# Patient Record
Sex: MALE | Race: WHITE | NOT HISPANIC OR LATINO | URBAN - METROPOLITAN AREA
[De-identification: names, ages, dates, MRNs, and addresses within clinical notes are randomized per-mention and may not be internally consistent; named-entity substitution may affect disease eponyms.]

---

## 2018-01-29 ENCOUNTER — OFFICE VISIT CONVERTED (OUTPATIENT)
Dept: OTOLARYNGOLOGY | Facility: CLINIC | Age: 73
End: 2018-01-29
Attending: OTOLARYNGOLOGY

## 2018-01-29 ENCOUNTER — CONVERSION ENCOUNTER (OUTPATIENT)
Dept: OTOLARYNGOLOGY | Facility: CLINIC | Age: 73
End: 2018-01-29

## 2018-03-13 ENCOUNTER — PROCEDURE VISIT CONVERTED (OUTPATIENT)
Dept: PODIATRY | Facility: CLINIC | Age: 73
End: 2018-03-13
Attending: PODIATRIST

## 2018-06-08 ENCOUNTER — CONVERSION ENCOUNTER (OUTPATIENT)
Dept: OTHER | Facility: HOSPITAL | Age: 73
End: 2018-06-08

## 2018-06-08 ENCOUNTER — PROCEDURE VISIT CONVERTED (OUTPATIENT)
Dept: PODIATRY | Facility: CLINIC | Age: 73
End: 2018-06-08
Attending: PODIATRIST

## 2018-08-31 ENCOUNTER — PROCEDURE VISIT CONVERTED (OUTPATIENT)
Dept: PODIATRY | Facility: CLINIC | Age: 73
End: 2018-08-31
Attending: PODIATRIST

## 2018-11-01 ENCOUNTER — OFFICE VISIT CONVERTED (OUTPATIENT)
Dept: UROLOGY | Facility: CLINIC | Age: 73
End: 2018-11-01
Attending: UROLOGY

## 2018-11-26 ENCOUNTER — PROCEDURE VISIT CONVERTED (OUTPATIENT)
Dept: PODIATRY | Facility: CLINIC | Age: 73
End: 2018-11-26
Attending: PODIATRIST

## 2019-03-07 ENCOUNTER — PROCEDURE VISIT CONVERTED (OUTPATIENT)
Dept: PODIATRY | Facility: CLINIC | Age: 74
End: 2019-03-07
Attending: PODIATRIST

## 2019-03-19 ENCOUNTER — HOSPITAL ENCOUNTER (OUTPATIENT)
Dept: OTHER | Facility: HOSPITAL | Age: 74
Discharge: HOME | End: 2019-03-19
Attending: INTERNAL MEDICINE

## 2019-03-19 LAB
ALBUMIN SERPL-MCNC: 4.1 G/DL (ref 3.5–5)
ALBUMIN/GLOB SERPL: 1.7 {RATIO} (ref 1.4–2.6)
ALP SERPL-CCNC: 93 U/L (ref 56–155)
ALT SERPL-CCNC: 17 U/L (ref 10–40)
ANION GAP SERPL CALC-SCNC: 18 MMOL/L (ref 8–19)
AST SERPL-CCNC: 17 U/L (ref 15–50)
BILIRUB SERPL-MCNC: 0.25 MG/DL (ref 0.2–1.3)
BUN SERPL-MCNC: 8 MG/DL (ref 5–25)
BUN/CREAT SERPL: 11 {RATIO} (ref 6–20)
CALCIUM SERPL-MCNC: 9 MG/DL (ref 8.7–10.4)
CHLORIDE SERPL-SCNC: 103 MMOL/L (ref 99–111)
CHOLEST SERPL-MCNC: 128 MG/DL (ref 107–200)
CHOLEST/HDLC SERPL: 2.9 {RATIO} (ref 3–6)
CONV CO2: 28 MMOL/L (ref 22–32)
CONV TOTAL PROTEIN: 6.5 G/DL (ref 6.3–8.2)
CREAT UR-MCNC: 0.74 MG/DL (ref 0.7–1.2)
EST. AVERAGE GLUCOSE BLD GHB EST-MCNC: 154 MG/DL
GFR SERPLBLD BASED ON 1.73 SQ M-ARVRAT: >60 ML/MIN/{1.73_M2}
GLOBULIN UR ELPH-MCNC: 2.4 G/DL (ref 2–3.5)
GLUCOSE SERPL-MCNC: 110 MG/DL (ref 70–99)
HBA1C MFR BLD: 7 % (ref 3.5–5.7)
HDLC SERPL-MCNC: 44 MG/DL (ref 40–60)
LDLC SERPL CALC-MCNC: 51 MG/DL (ref 70–100)
OSMOLALITY SERPL CALC.SUM OF ELEC: 299 MOSM/KG (ref 273–304)
POTASSIUM SERPL-SCNC: 4.3 MMOL/L (ref 3.5–5.3)
SODIUM SERPL-SCNC: 145 MMOL/L (ref 135–147)
TRIGL SERPL-MCNC: 164 MG/DL (ref 40–150)
VLDLC SERPL-MCNC: 33 MG/DL (ref 5–37)

## 2019-05-30 ENCOUNTER — PROCEDURE VISIT CONVERTED (OUTPATIENT)
Dept: PODIATRY | Facility: CLINIC | Age: 74
End: 2019-05-30
Attending: PODIATRIST

## 2019-06-24 ENCOUNTER — HOSPITAL ENCOUNTER (OUTPATIENT)
Dept: CT IMAGING | Facility: HOSPITAL | Age: 74
Discharge: HOME | End: 2019-06-24
Attending: INTERNAL MEDICINE

## 2019-06-24 LAB
AMPHETAMINES UR QL SCN: NEGATIVE
ANION GAP SERPL CALC-SCNC: 17 MMOL/L (ref 8–19)
APPEARANCE UR: CLEAR
BARBITURATES UR QL SCN: NEGATIVE
BASOPHILS # BLD AUTO: 0.03 10*3/UL (ref 0–0.2)
BASOPHILS NFR BLD AUTO: 0.6 % (ref 0–3)
BENZODIAZ UR QL SCN: NEGATIVE
BILIRUB UR QL: NEGATIVE
BUN SERPL-MCNC: 9 MG/DL (ref 5–25)
BUN/CREAT SERPL: 11 {RATIO} (ref 6–20)
CALCIUM SERPL-MCNC: 9.1 MG/DL (ref 8.7–10.4)
CHLORIDE SERPL-SCNC: 102 MMOL/L (ref 99–111)
COLOR UR: YELLOW
CONV ABS IMM GRAN: 0.03 10*3/UL (ref 0–0.2)
CONV BACTERIA: NEGATIVE
CONV CO2: 27 MMOL/L (ref 22–32)
CONV COCAINE, UR: NEGATIVE
CONV COLLECTION SOURCE (UA): ABNORMAL
CONV IMMATURE GRAN: 0.6 % (ref 0–1.8)
CONV UROBILINOGEN IN URINE BY AUTOMATED TEST STRIP: 1 {EHRLICHU}/DL (ref 0.1–1)
CREAT UR-MCNC: 0.84 MG/DL (ref 0.7–1.2)
DEPRECATED RDW RBC AUTO: 43.2 FL (ref 35.1–43.9)
EOSINOPHIL # BLD AUTO: 0.14 10*3/UL (ref 0–0.7)
EOSINOPHIL # BLD AUTO: 2.6 % (ref 0–7)
ERYTHROCYTE [DISTWIDTH] IN BLOOD BY AUTOMATED COUNT: 12.7 % (ref 11.6–14.4)
EST. AVERAGE GLUCOSE BLD GHB EST-MCNC: 160 MG/DL
GFR SERPLBLD BASED ON 1.73 SQ M-ARVRAT: >60 ML/MIN/{1.73_M2}
GLUCOSE SERPL-MCNC: 287 MG/DL (ref 70–99)
GLUCOSE UR QL: 500 MG/DL
HBA1C MFR BLD: 12.1 G/DL (ref 14–18)
HBA1C MFR BLD: 7.2 % (ref 3.5–5.7)
HCT VFR BLD AUTO: 38 % (ref 42–52)
HGB UR QL STRIP: ABNORMAL
KETONES UR QL STRIP: NEGATIVE MG/DL
LEUKOCYTE ESTERASE UR QL STRIP: NEGATIVE
LYMPHOCYTES # BLD AUTO: 1.3 10*3/UL (ref 1–5)
MCH RBC QN AUTO: 29.6 PG (ref 27–31)
MCHC RBC AUTO-ENTMCNC: 31.8 G/DL (ref 33–37)
MCV RBC AUTO: 92.9 FL (ref 80–96)
METHADONE UR QL SCN: NEGATIVE
MONOCYTES # BLD AUTO: 0.48 10*3/UL (ref 0.2–1.2)
MONOCYTES NFR BLD AUTO: 8.8 % (ref 3–10)
NEUTROPHILS # BLD AUTO: 3.46 10*3/UL (ref 2–8)
NEUTROPHILS NFR BLD AUTO: 63.5 % (ref 30–85)
NITRITE UR QL STRIP: NEGATIVE
NRBC CBCN: 0 % (ref 0–0.7)
OPIATES TESTED UR SCN: NEGATIVE
OSMOLALITY SERPL CALC.SUM OF ELEC: 303 MOSM/KG (ref 273–304)
OXYCODONE UR QL SCN: NEGATIVE
PCP UR QL: NEGATIVE
PH UR STRIP.AUTO: 6 [PH] (ref 5–8)
PLATELET # BLD AUTO: 195 10*3/UL (ref 130–400)
PMV BLD AUTO: 9.9 FL (ref 9.4–12.4)
POTASSIUM SERPL-SCNC: 4.4 MMOL/L (ref 3.5–5.3)
PROT UR QL: NEGATIVE MG/DL
RBC # BLD AUTO: 4.09 10*6/UL (ref 4.7–6.1)
RBC #/AREA URNS HPF: ABNORMAL /[HPF]
SODIUM SERPL-SCNC: 142 MMOL/L (ref 135–147)
SP GR UR: 1.01 (ref 1–1.03)
THC SERPLBLD CFM-MCNC: NEGATIVE NG/ML
VARIANT LYMPHS NFR BLD MANUAL: 23.9 % (ref 20–45)
WBC # BLD AUTO: 5.44 10*3/UL (ref 4.8–10.8)
WBC #/AREA URNS HPF: ABNORMAL /[HPF]

## 2019-06-26 LAB — BACTERIA UR CULT: NORMAL

## 2019-08-20 ENCOUNTER — HOSPITAL ENCOUNTER (OUTPATIENT)
Dept: OTHER | Facility: HOSPITAL | Age: 74
Discharge: HOME | End: 2019-08-20
Attending: INTERNAL MEDICINE

## 2019-08-20 LAB
ANION GAP SERPL CALC-SCNC: 21 MMOL/L (ref 8–19)
BUN SERPL-MCNC: 10 MG/DL (ref 5–25)
BUN/CREAT SERPL: 12 {RATIO} (ref 6–20)
CALCIUM SERPL-MCNC: 9.2 MG/DL (ref 8.7–10.4)
CHLORIDE SERPL-SCNC: 100 MMOL/L (ref 99–111)
CONV CO2: 25 MMOL/L (ref 22–32)
CREAT UR-MCNC: 0.82 MG/DL (ref 0.7–1.2)
EST. AVERAGE GLUCOSE BLD GHB EST-MCNC: 157 MG/DL
GFR SERPLBLD BASED ON 1.73 SQ M-ARVRAT: >60 ML/MIN/{1.73_M2}
GLUCOSE SERPL-MCNC: 192 MG/DL (ref 70–99)
HBA1C MFR BLD: 7.1 % (ref 3.5–5.7)
OSMOLALITY SERPL CALC.SUM OF ELEC: 296 MOSM/KG (ref 273–304)
POTASSIUM SERPL-SCNC: 5.1 MMOL/L (ref 3.5–5.3)
PSA SERPL-MCNC: <0.01 NG/ML (ref 0–4)
SODIUM SERPL-SCNC: 141 MMOL/L (ref 135–147)

## 2019-09-25 ENCOUNTER — PROCEDURE VISIT CONVERTED (OUTPATIENT)
Dept: PODIATRY | Facility: CLINIC | Age: 74
End: 2019-09-25
Attending: PODIATRIST

## 2019-11-11 ENCOUNTER — HOSPITAL ENCOUNTER (OUTPATIENT)
Dept: NUCLEAR MEDICINE | Facility: HOSPITAL | Age: 74
Discharge: HOME | End: 2019-11-11
Attending: INTERNAL MEDICINE

## 2019-11-25 ENCOUNTER — CONVERSION ENCOUNTER (OUTPATIENT)
Dept: NEUROLOGY | Facility: CLINIC | Age: 74
End: 2019-11-25

## 2019-11-25 ENCOUNTER — OFFICE VISIT CONVERTED (OUTPATIENT)
Dept: NEUROLOGY | Facility: CLINIC | Age: 74
End: 2019-11-25
Attending: PSYCHIATRY & NEUROLOGY

## 2019-12-03 ENCOUNTER — HOSPITAL ENCOUNTER (OUTPATIENT)
Dept: OTHER | Facility: HOSPITAL | Age: 74
Discharge: HOME | End: 2019-12-03
Attending: INTERNAL MEDICINE

## 2019-12-03 LAB
ALBUMIN SERPL-MCNC: 4.4 G/DL (ref 3.5–5)
ALBUMIN/GLOB SERPL: 1.7 {RATIO} (ref 1.4–2.6)
ALP SERPL-CCNC: 79 U/L (ref 56–155)
ALT SERPL-CCNC: 22 U/L (ref 10–40)
ANION GAP SERPL CALC-SCNC: 18 MMOL/L (ref 8–19)
AST SERPL-CCNC: 26 U/L (ref 15–50)
BASOPHILS # BLD AUTO: 0.06 10*3/UL (ref 0–0.2)
BASOPHILS NFR BLD AUTO: 0.9 % (ref 0–3)
BILIRUB SERPL-MCNC: 0.31 MG/DL (ref 0.2–1.3)
BUN SERPL-MCNC: 11 MG/DL (ref 5–25)
BUN/CREAT SERPL: 14 {RATIO} (ref 6–20)
CALCIUM SERPL-MCNC: 9.9 MG/DL (ref 8.7–10.4)
CHLORIDE SERPL-SCNC: 104 MMOL/L (ref 99–111)
CHOLEST SERPL-MCNC: 144 MG/DL (ref 107–200)
CHOLEST/HDLC SERPL: 3 {RATIO} (ref 3–6)
CONV ABS IMM GRAN: 0.06 10*3/UL (ref 0–0.2)
CONV CO2: 28 MMOL/L (ref 22–32)
CONV CREATININE URINE, RANDOM: 66.2 MG/DL (ref 10–300)
CONV IMMATURE GRAN: 0.9 % (ref 0–1.8)
CONV MICROALBUM.,U,RANDOM: <12 MG/L (ref 0–20)
CONV TOTAL PROTEIN: 7 G/DL (ref 6.3–8.2)
CREAT UR-MCNC: 0.8 MG/DL (ref 0.7–1.2)
DEPRECATED RDW RBC AUTO: 44.3 FL (ref 35.1–43.9)
EOSINOPHIL # BLD AUTO: 0.14 10*3/UL (ref 0–0.7)
EOSINOPHIL # BLD AUTO: 2.1 % (ref 0–7)
ERYTHROCYTE [DISTWIDTH] IN BLOOD BY AUTOMATED COUNT: 12.9 % (ref 11.6–14.4)
EST. AVERAGE GLUCOSE BLD GHB EST-MCNC: 151 MG/DL
FOLATE SERPL-MCNC: 10.9 NG/ML (ref 4.8–20)
GFR SERPLBLD BASED ON 1.73 SQ M-ARVRAT: >60 ML/MIN/{1.73_M2}
GLOBULIN UR ELPH-MCNC: 2.6 G/DL (ref 2–3.5)
GLUCOSE SERPL-MCNC: 110 MG/DL (ref 70–99)
HBA1C MFR BLD: 6.9 % (ref 3.5–5.7)
HCT VFR BLD AUTO: 42.8 % (ref 42–52)
HDLC SERPL-MCNC: 48 MG/DL (ref 40–60)
HGB BLD-MCNC: 13.6 G/DL (ref 14–18)
LDLC SERPL CALC-MCNC: 62 MG/DL (ref 70–100)
LYMPHOCYTES # BLD AUTO: 1.72 10*3/UL (ref 1–5)
LYMPHOCYTES NFR BLD AUTO: 25.3 % (ref 20–45)
MCH RBC QN AUTO: 29.9 PG (ref 27–31)
MCHC RBC AUTO-ENTMCNC: 31.8 G/DL (ref 33–37)
MCV RBC AUTO: 94.1 FL (ref 80–96)
MICROALBUMIN/CREAT UR: 18.1 MG/G{CRE} (ref 0–25)
MONOCYTES # BLD AUTO: 0.56 10*3/UL (ref 0.2–1.2)
MONOCYTES NFR BLD AUTO: 8.2 % (ref 3–10)
NEUTROPHILS # BLD AUTO: 4.25 10*3/UL (ref 2–8)
NEUTROPHILS NFR BLD AUTO: 62.6 % (ref 30–85)
NRBC CBCN: 0 % (ref 0–0.7)
OSMOLALITY SERPL CALC.SUM OF ELEC: 300 MOSM/KG (ref 273–304)
PLATELET # BLD AUTO: 235 10*3/UL (ref 130–400)
PMV BLD AUTO: 11 FL (ref 9.4–12.4)
POTASSIUM SERPL-SCNC: 4.9 MMOL/L (ref 3.5–5.3)
RBC # BLD AUTO: 4.55 10*6/UL (ref 4.7–6.1)
SODIUM SERPL-SCNC: 145 MMOL/L (ref 135–147)
TRIGL SERPL-MCNC: 172 MG/DL (ref 40–150)
VIT B12 SERPL-MCNC: 278 PG/ML (ref 211–911)
VLDLC SERPL-MCNC: 34 MG/DL (ref 5–37)
WBC # BLD AUTO: 6.79 10*3/UL (ref 4.8–10.8)

## 2019-12-06 ENCOUNTER — HOSPITAL ENCOUNTER (OUTPATIENT)
Dept: MRI IMAGING | Facility: HOSPITAL | Age: 74
Discharge: HOME | End: 2019-12-06

## 2019-12-11 ENCOUNTER — PROCEDURE VISIT CONVERTED (OUTPATIENT)
Dept: PODIATRY | Facility: CLINIC | Age: 74
End: 2019-12-11
Attending: PODIATRIST

## 2020-03-04 ENCOUNTER — PROCEDURE VISIT CONVERTED (OUTPATIENT)
Dept: PODIATRY | Facility: CLINIC | Age: 75
End: 2020-03-04
Attending: PODIATRIST

## 2020-04-29 ENCOUNTER — HOSPITAL ENCOUNTER (OUTPATIENT)
Dept: OTHER | Facility: HOSPITAL | Age: 75
Discharge: HOME | End: 2020-04-29
Attending: INTERNAL MEDICINE

## 2020-04-29 LAB
ANION GAP SERPL CALC-SCNC: 28 MMOL/L (ref 8–19)
BUN SERPL-MCNC: 12 MG/DL (ref 5–25)
BUN/CREAT SERPL: 13 {RATIO} (ref 6–20)
CALCIUM SERPL-MCNC: 9.8 MG/DL (ref 8.7–10.4)
CHLORIDE SERPL-SCNC: 102 MMOL/L (ref 99–111)
CONV CO2: 20 MMOL/L (ref 22–32)
CREAT UR-MCNC: 0.96 MG/DL (ref 0.7–1.2)
EST. AVERAGE GLUCOSE BLD GHB EST-MCNC: 209 MG/DL
FOLATE SERPL-MCNC: >20 NG/ML (ref 4.8–20)
GFR SERPLBLD BASED ON 1.73 SQ M-ARVRAT: >60 ML/MIN/{1.73_M2}
GLUCOSE SERPL-MCNC: 209 MG/DL (ref 70–99)
HBA1C MFR BLD: 8.9 % (ref 3.5–5.7)
OSMOLALITY SERPL CALC.SUM OF ELEC: 306 MOSM/KG (ref 273–304)
POTASSIUM SERPL-SCNC: 4.7 MMOL/L (ref 3.5–5.3)
SODIUM SERPL-SCNC: 145 MMOL/L (ref 135–147)
VIT B12 SERPL-MCNC: 444 PG/ML (ref 211–911)

## 2020-06-24 ENCOUNTER — PROCEDURE VISIT CONVERTED (OUTPATIENT)
Dept: PODIATRY | Facility: CLINIC | Age: 75
End: 2020-06-24
Attending: PODIATRIST

## 2020-07-06 ENCOUNTER — HOSPITAL ENCOUNTER (OUTPATIENT)
Dept: LAB | Facility: HOSPITAL | Age: 75
Discharge: HOME | End: 2020-07-06
Attending: INTERNAL MEDICINE

## 2020-07-06 LAB
ANION GAP SERPL CALC-SCNC: 16 MMOL/L (ref 8–19)
BUN SERPL-MCNC: 15 MG/DL (ref 5–25)
BUN/CREAT SERPL: 15 {RATIO} (ref 6–20)
CALCIUM SERPL-MCNC: 9.3 MG/DL (ref 8.7–10.4)
CHLORIDE SERPL-SCNC: 101 MMOL/L (ref 99–111)
CONV CO2: 24 MMOL/L (ref 22–32)
CREAT UR-MCNC: 0.97 MG/DL (ref 0.7–1.2)
EST. AVERAGE GLUCOSE BLD GHB EST-MCNC: 206 MG/DL
GFR SERPLBLD BASED ON 1.73 SQ M-ARVRAT: >60 ML/MIN/{1.73_M2}
GLUCOSE SERPL-MCNC: 177 MG/DL (ref 70–99)
HBA1C MFR BLD: 8.8 % (ref 3.5–5.7)
OSMOLALITY SERPL CALC.SUM OF ELEC: 289 MOSM/KG (ref 273–304)
POTASSIUM SERPL-SCNC: 4.4 MMOL/L (ref 3.5–5.3)
SODIUM SERPL-SCNC: 137 MMOL/L (ref 135–147)

## 2020-07-27 ENCOUNTER — CONVERSION ENCOUNTER (OUTPATIENT)
Dept: SURGERY | Facility: CLINIC | Age: 75
End: 2020-07-27

## 2020-07-27 ENCOUNTER — OFFICE VISIT CONVERTED (OUTPATIENT)
Dept: UROLOGY | Facility: CLINIC | Age: 75
End: 2020-07-27
Attending: UROLOGY

## 2020-09-22 ENCOUNTER — HOSPITAL ENCOUNTER (OUTPATIENT)
Dept: GENERAL RADIOLOGY | Facility: HOSPITAL | Age: 75
Discharge: HOME | End: 2020-09-22
Attending: INTERNAL MEDICINE

## 2020-09-22 LAB
ALBUMIN SERPL-MCNC: 4.1 G/DL (ref 3.5–5)
ALBUMIN/GLOB SERPL: 1.8 {RATIO} (ref 1.4–2.6)
ALP SERPL-CCNC: 95 U/L (ref 56–155)
ALT SERPL-CCNC: 27 U/L (ref 10–40)
ANION GAP SERPL CALC-SCNC: 13 MMOL/L (ref 8–19)
AST SERPL-CCNC: 27 U/L (ref 15–50)
BASOPHILS # BLD AUTO: 0.04 10*3/UL (ref 0–0.2)
BASOPHILS NFR BLD AUTO: 0.6 % (ref 0–3)
BILIRUB SERPL-MCNC: 0.23 MG/DL (ref 0.2–1.3)
BUN SERPL-MCNC: 14 MG/DL (ref 5–25)
BUN/CREAT SERPL: 16 {RATIO} (ref 6–20)
CALCIUM SERPL-MCNC: 9.1 MG/DL (ref 8.7–10.4)
CHLORIDE SERPL-SCNC: 106 MMOL/L (ref 99–111)
CHOLEST SERPL-MCNC: 126 MG/DL (ref 107–200)
CHOLEST/HDLC SERPL: 2.8 {RATIO} (ref 3–6)
CONV ABS IMM GRAN: 0.02 10*3/UL (ref 0–0.2)
CONV CO2: 27 MMOL/L (ref 22–32)
CONV IMMATURE GRAN: 0.3 % (ref 0–1.8)
CONV TOTAL PROTEIN: 6.4 G/DL (ref 6.3–8.2)
CREAT UR-MCNC: 0.89 MG/DL (ref 0.7–1.2)
DEPRECATED RDW RBC AUTO: 46.7 FL (ref 35.1–43.9)
EOSINOPHIL # BLD AUTO: 0.26 10*3/UL (ref 0–0.7)
EOSINOPHIL # BLD AUTO: 3.6 % (ref 0–7)
ERYTHROCYTE [DISTWIDTH] IN BLOOD BY AUTOMATED COUNT: 13.4 % (ref 11.6–14.4)
EST. AVERAGE GLUCOSE BLD GHB EST-MCNC: 143 MG/DL
FOLATE SERPL-MCNC: 13.8 NG/ML (ref 4.8–20)
GFR SERPLBLD BASED ON 1.73 SQ M-ARVRAT: >60 ML/MIN/{1.73_M2}
GLOBULIN UR ELPH-MCNC: 2.3 G/DL (ref 2–3.5)
GLUCOSE SERPL-MCNC: 154 MG/DL (ref 70–99)
HBA1C MFR BLD: 6.6 % (ref 3.5–5.7)
HCT VFR BLD AUTO: 40.8 % (ref 42–52)
HDLC SERPL-MCNC: 45 MG/DL (ref 40–60)
HGB BLD-MCNC: 12.8 G/DL (ref 14–18)
LDLC SERPL CALC-MCNC: 44 MG/DL (ref 70–100)
LYMPHOCYTES # BLD AUTO: 1.08 10*3/UL (ref 1–5)
LYMPHOCYTES NFR BLD AUTO: 15 % (ref 20–45)
MCH RBC QN AUTO: 30 PG (ref 27–31)
MCHC RBC AUTO-ENTMCNC: 31.4 G/DL (ref 33–37)
MCV RBC AUTO: 95.8 FL (ref 80–96)
MONOCYTES # BLD AUTO: 0.49 10*3/UL (ref 0.2–1.2)
MONOCYTES NFR BLD AUTO: 6.8 % (ref 3–10)
NEUTROPHILS # BLD AUTO: 5.31 10*3/UL (ref 2–8)
NEUTROPHILS NFR BLD AUTO: 73.7 % (ref 30–85)
NRBC CBCN: 0 % (ref 0–0.7)
OSMOLALITY SERPL CALC.SUM OF ELEC: 298 MOSM/KG (ref 273–304)
PLATELET # BLD AUTO: 187 10*3/UL (ref 130–400)
PMV BLD AUTO: 10.4 FL (ref 9.4–12.4)
POTASSIUM SERPL-SCNC: 4.4 MMOL/L (ref 3.5–5.3)
RBC # BLD AUTO: 4.26 10*6/UL (ref 4.7–6.1)
SODIUM SERPL-SCNC: 142 MMOL/L (ref 135–147)
TRIGL SERPL-MCNC: 185 MG/DL (ref 40–150)
VIT B12 SERPL-MCNC: 433 PG/ML (ref 211–911)
VLDLC SERPL-MCNC: 37 MG/DL (ref 5–37)
WBC # BLD AUTO: 7.2 10*3/UL (ref 4.8–10.8)

## 2020-09-23 ENCOUNTER — PROCEDURE VISIT CONVERTED (OUTPATIENT)
Dept: PODIATRY | Facility: CLINIC | Age: 75
End: 2020-09-23
Attending: PODIATRIST

## 2020-10-16 ENCOUNTER — OFFICE VISIT CONVERTED (OUTPATIENT)
Dept: ORTHOPEDIC SURGERY | Facility: CLINIC | Age: 75
End: 2020-10-16
Attending: ORTHOPAEDIC SURGERY

## 2020-11-11 ENCOUNTER — HOSPITAL ENCOUNTER (OUTPATIENT)
Dept: OTHER | Facility: HOSPITAL | Age: 75
Discharge: HOME | End: 2020-11-11
Attending: INTERNAL MEDICINE

## 2021-05-13 NOTE — PROGRESS NOTES
Progress Note      Patient Name: Ami Ferrer   Patient ID: [id number]   Sex: Male   YOB: 1945    Primary Care Provider: Mundo Basilio MD   Referring Provider: Mundo Basilio MD    Visit Date: September 23, 2020    Provider: Hardik Pitts DPM   Location: [facility]   Location Address: [address] [place] [address]  [id number]   Location Phone: [phone]          Chief Complaint  · Routine Foot Care Visit  · Diabetic Foot Examination       History Of Present Illness  Ami Ferrer complains of painful, elongated toenails which are thickened, yellowed, chalky, and cause pain with shoe gear and ambulation.      New, Established, New Problem:  est  Location:  Toenails  Duration:  Greater than five years  Onset:  Gradual  Nature:  sore with palpation.  Stable, worsening, improving:  stable  Aggravating factors:  Pain with shoe gear and ambulation.  Previous Treatment:  debridement    Patient denies any fevers, chills, nausea, vomiting, shortness of breathe, nor any other constitutional signs nor symptoms.    Patient reports the following medical changes since their last visit:    - CT of head for headaches.    Pt states their most recent blood glucose reading was 115.       Past Medical History  Allergic rhinitis, chronic; Arthritis; Asthma; Chronic Obstructive Pulmonary Disease; COPD; Depression; Diabetes; Diabetes Mellitus, Type II; Difficulty in walking; Fatigue; Foot cramps; Foot pain, left; Foot pain, right; Fracture; GERD; Gout; Heart disease; Herniated Disc; High blood pressure; High cholesterol; Hypogonadism (Testicular Failure); Ingrowing nail; Ingrowing toenail; Leg pain; Limb Pain; Limb Swelling; Muscle cramps; Neuropathy; Numbness in feet; Polyneuropathy; Prostate Disorder; Shortness Of Air; Tinea unguium; Type 2 diabetes mellitus with polyneuropathy         Past Surgical History  *Other; Arm Surgery; Finger Surgery; Hand surgery; Penile implant; Polypectomy; Sinus  Surgery         Medication List  albuterol sulfate 90 mcg/actuation inhalation HFA aerosol inhaler; allopurinol 100 mg oral tablet; Amitiza 24 mcg oral capsule; aspirin 81 mg oral tablet,delayed release (DR/EC); Ativan 1 mg oral tablet; atorvastatin 10 mg oral tablet; celecoxib 100 mg oral capsule; cetirizine 10 mg oral tablet; cyanocobalamin (vitamin B-12) 1,000 mcg/mL injection solution; Daily Multiple oral tablet; Dulera 200-5 mcg/actuation inhalation HFA aerosol inhaler; finasteride 5 mg oral tablet; fluticasone propionate 50 mcg/actuation nasal spray,suspension; glyburide 2.5 mg oral tablet; metformin 500 mg oral tablet; metoprolol succinate 25 mg oral tablet extended release 24 hr; omeprazole 20 mg oral capsule,delayed release(DR/EC); sertraline 100 mg oral tablet; Spiriva with HandiHaler 18 mcg inhalation capsule, w/inhalation device; tamsulosin 0.4 mg oral capsule; tramadol 50 mg oral tablet; trazodone 50 mg oral tablet; Tylenol Arthritis Pain 650 mg oral tablet extended release; vitamin B complex oral tablet; Vitamin B-12 500 mcg oral tablet; Vitamin D3 2,000 unit oral capsule         Allergy List  I.V. Dye       Allergies Reconciled  Family Medical History  Pancreatic Neoplasm, Malignant; Diabetes, unspecified type; Throat Cancer; Brain tumor; Family history of colon cancer; Bone Neoplasm, Malignant; Family history of Arthritis; Family history of cancer         Social History  Alcohol (Never); lives alone; Retired; Tobacco (Former);          Review of Systems  · Constitutional  o Denies  o : fever, chills  · Eyes  o Denies  o : double vision  · HENT  o Denies  o : vertigo, recent head injury, hearing loss or ringing  · Cardiovascular  o Denies  o : chest pain, irregular heart beats  · Respiratory  o Denies  o : shortness of breath, productive cough  · Gastrointestinal  o Denies  o : nausea, vomiting  · Integument  o * See HPI  · Neurologic  o Admits  o : tingling or numbness  o Denies  o : altered  "mental status, seizures  · Musculoskeletal  o Denies  o : joint pain, joint swelling, limitation of motion      Vitals  Date Time BP Position Site L\R Cuff Size HR RR TEMP (F) WT  HT  BMI kg/m2 BSA m2 O2 Sat HC       09/23/2020 12:53 /67 Sitting    66 - R  97.3 174lbs 16oz 5'  7.5\" 27 1.94 98 %          Physical Examination  · Constitutional  o Appearance  o : well-nourished, well developed, no obvious deformities present, appears to be chronically ill   · Eyes  o Vision  o : Patient wearing glasses.   · Respiratory  o Respiratory Effort  o : No labored breathing. Good respiratory effort.   · Cardiovascular  o Peripheral Vascular System  o :   § Pedal Pulses  § : Pedal Pulses are 2+ and symmetrical  § Extremities  § : There is no edema of the lower extremities  · Musculoskeletal  o Extremeties/Joint  o : Lower extremity muscle strength and range of motion is equal and symmetrical bilaterally. The knees are noted to be in normal alignment. Ankle alignment and range of motion is normal and foot structure is normal.  · Skin and Subcutaneous Tissue  o General Inspection  o : no lesions present, no areas of discoloration, skin turgor normal, texture normal  · Neurologic  o Sensation  o : Lagrange-Melo 5.07 monofilament absent to all assessed areas. Sharp/dull sensation is absent.  · Toes  o Toes: Right Foot  o :   § Inspection  § : semi-rigid flexion deformity-1st toe 2nd toe 3rd toe 4th toe 5th toe PIPJ   § Toenails  § : Toenails are hypertrophic, mycotc, dystrophic, thickened, with sublingual debris, incurvated, brittle toenail(s) at nail, Oncholysis of the foot   o Toes: Left Foot  o :   § Inspection  § : semi-rigid flexion deformity-2nd toe 3rd toe 4th toe 5th toe PIPJ   § Toenails  § : Toenails are hypertrophic, mycotc, dystrophic, thickened, with sublingual debris, incurvated, brittle toenail(s) at nail-2, 3, 4,, Onycholysis of the foot   · Procedures  o Nail Debridement  o : Nail debridement is indicated " for the following toenails:, left hallux, left 2nd toe, left 3rd toe, left 4th toe, left 5th toe, right hallux, right 2nd toe, right 3rd toe, right 4th toe, right 5th toe. The nail was debrided of excessive thickness to appropriate levels of comfort and contour using, nail nippers. The procedure was without complications          Assessment  · Foot pain, left     729.5/M79.672  · Foot pain, right     729.5/M79.671  · Ingrowing nail     703.0/L60.0  · Polyneuropathy     356.9/G62.9  · Tinea unguium     110.1/B35.1  · Type 2 diabetes mellitus with polyneuropathy       Type 2 diabetes mellitus with diabetic polyneuropathy     250.60/E11.42  · Difficulty in walking     719.7/R26.2      Plan  · Orders  o Debridement of six or more nails (98294, 96277, 18005, 62862, 43907, 53839, 32679) - 250.60/E11.42, 729.5/M79.672, 729.5/M79.671, 110.1/B35.1 - 09/23/2020  o Diabetic Foot (Motor and Sensory) Exam Completed [facility] (, , 2028F) - 250.60/E11.42 - 09/23/2020  · Medications  o Medications have been Reconciled  o Transition of Care or Provider Policy  · Instructions  o I have discussed the findings of this evaluation with the patient. The discussion included a complete verbal explanation of any changes in the examination results, diagnosis, and the current treatment plan. A schedule for future care needs was explained. If any questions should arise after returning home, I have encouraged the patient to feel free to contact Dr. Pitts. The patient states understanding and agreement with this plan.   o Patient is to monitor for problems and to contact Dr. Pitts for follow-up should such signs occur. Patient states understanding and agreement with this plan.   o Encouraged to follow-up with Primary Care Provider for preventative care.   o Diabetic foot exam performed and documented this date, compliant with [facility] required standards. Detail of findings as noted in physical exam.Lower extremity Neuro exam for diabetic  patient performed and documented this date, compliant with PQRS required standards. Detail of findings as noted in physical exam.Advised patient importance of good routine lower extremity hygiene. Advised patient importance of evaluating for intact skin and pain free nail borders. Advised patient to use mirror to evaluate plantar/ soles of feet for better visualization. Advised patient monitor and phone office to be seen if any cracking to skin, open lesions, painful nail borders or if nails become elongated prior to next visit. Advised patient importance of daily cleansing of lower extremities, followed by good skin cream to maintain normal hydration of skin. Also advised patient importance of close daily monitoring of blood sugar. Advised to regulate diet and medications to maintain control of blood sugar in optimal range. Contact primary care provider if difficulties maintaining blood sugar levels.Advised Patient of presence of Diabetes Mellitus condition. Advised Patient risk of progression and worsening or improvement, then return of condition. Will monitor condition for any change in future. Treat with most appropriate treatment pending status of condition. Counseled and advised patient extensively on nature and ramifications of diabetes. Standard instructions given to patient for good diabetic foot care and maintenance. Advised importance of careful monitoring to avoid break down and complications secondary to diabetes. Advised patient importance of strict maintenance of blood sugar control. Advised patient of possible ominous results from neglect of condition, i.e.: amputation/ loss of digits, feet and legs, or even death.Patient states understands counseling, will monitor closely, continue good hygiene and routine diabetic foot care. Patient will contact office is questions or problems.   o Follow up in 9 weeks for Routine Foot Care.   o Electronically Identified Patient Education Materials Provided  Electronically  · Disposition  o Call or Return if symptoms worsen or persist.            Electronically Signed by: Hardik Pitts DPM -Author on September 23, 2020 01:06:31 PM

## 2021-05-13 NOTE — PROGRESS NOTES
Progress Note      Patient Name: Ami Ferrer   Patient ID: [id number]   Sex: Male   YOB: 1945    Primary Care Provider: Mundo Basilio MD   Referring Provider: Mundo Basilio MD    Visit Date: July 27, 2020    Provider: Karis Sousa MD   Location: [facility]   Location Address: [address] [place] [address]  [id number]   Location Phone: [phone]          Chief Complaint  · urinary incontinence      History Of Present Illness     The patient is a very pleasant 75-year-old male is seen in follow-up for problems with BPH and bladder outlet obstruction and hypogonadism.      He is on finasteride and Flomax and is doing well on those.  He has stopped dribbling but does have some frequency and urgency.     He had been on testosterone at one point but is not now.              Past Medical History  Allergic rhinitis, chronic; Arthritis; Asthma; Chronic Obstructive Pulmonary Disease; COPD; Depression; Diabetes; Diabetes Mellitus, Type II; Difficulty in walking; Fatigue; Foot cramps; Foot pain, left; Foot pain, right; Fracture; GERD; Gout; Heart disease; Herniated Disc; High blood pressure; High cholesterol; Hypogonadism (Testicular Failure); Ingrowing nail; Ingrowing toenail; Leg pain; Limb Pain; Limb Swelling; Muscle cramps; Neuropathy; Numbness in feet; Polyneuropathy; Prostate Disorder; Shortness Of Air; Tinea unguium; Type 2 diabetes mellitus with polyneuropathy         Past Surgical History  *Other; Arm Surgery; Finger Surgery; Hand surgery; Penile implant; Polypectomy; Sinus Surgery         Medication List  albuterol sulfate 90 mcg/actuation inhalation HFA aerosol inhaler; allopurinol 100 mg oral tablet; Amitiza 24 mcg oral capsule; aspirin 81 mg oral tablet,delayed release (DR/EC); Ativan 1 mg oral tablet; atorvastatin 10 mg oral tablet; celecoxib 100 mg oral capsule; cetirizine 10 mg oral tablet; cyanocobalamin (vitamin B-12) 1,000 mcg/mL injection solution; Daily Multiple oral  "tablet; Dulera 200-5 mcg/actuation inhalation HFA aerosol inhaler; finasteride 5 mg oral tablet; fluticasone propionate 50 mcg/actuation nasal spray,suspension; glyburide 2.5 mg oral tablet; metformin 500 mg oral tablet; metoprolol succinate 25 mg oral tablet extended release 24 hr; omeprazole 20 mg oral capsule,delayed release(DR/EC); sertraline 100 mg oral tablet; Spiriva with HandiHaler 18 mcg inhalation capsule, w/inhalation device; tamsulosin 0.4 mg oral capsule,extended release 24hr; tramadol 50 mg oral tablet; trazodone 50 mg oral tablet; Tylenol Arthritis Pain 650 mg oral tablet extended release; vitamin B complex oral tablet; Vitamin B-12 500 mcg oral tablet; Vitamin D3 2,000 unit oral capsule         Allergy List  I.V. Dye       Allergies Reconciled  Family Medical History  Pancreatic Neoplasm, Malignant; Diabetes, unspecified type; Throat Cancer; Brain tumor; Family history of colon cancer; Bone Neoplasm, Malignant; Family history of Arthritis; Family history of cancer         Social History  Alcohol (Never); lives alone; Retired; Tobacco (Former);          Review of Systems  · Constitutional  o Denies  o : chills, fever  · Gastrointestinal  o Denies  o : nausea, vomiting      Vitals  Date Time BP Position Site L\R Cuff Size HR RR TEMP (F) WT  HT  BMI kg/m2 BSA m2 O2 Sat        07/27/2020 04:15 /48 Sitting       173lbs 0oz 5'  7.5\" 26.7 1.93           Physical Examination  · Constitutional  o Appearance  o : well-nourished, well developed, alert, in no acute distress  · Respiratory  o Respiratory Effort  o : breathing unlabored  · Gastrointestinal  o Abdominal Examination  o : abdomen nontender to palpation, tone normal without rigidity or guarding, no masses present, abdominal contour scaphoid  · Neurologic  o Gait and Station  o : normal gait, able to stand without difficulty  · Psychiatric  o Judgement and Insight  o : judgment and insight intact, judgement for everyday activities and " social situations within normal limits, insight intact  o Mood and Affect  o : mood normal, affect appropriate          Results  · In-Office Procedures  o Lab procedure  § Automated dipstick urinalysis with microscopy (25528)   § Color Ur: Yellow   § Clarity Ur: Clear   § Glucose Ur Ql Strip: Negative   § Bilirub Ur Ql Strip: Negative   § Ketones Ur Ql Strip: Negative   § Sp Gr Ur Qn: 1.020   § Hgb Ur Ql Strip: Negative   § pH Ur-LsCnc: 5.5   § Prot Ur Ql Strip: Negative   § Urobilinogen Ur Strip-mCnc: 0.2   § Nitrite Ur Ql Strip: Negative   § WBC Est Ur Ql Strip: Negative   § RBC UrnS Qn HPF: 0   § WBC UrnS Qn HPF: 0   § Bacteria UrnS Qn HPF: 0   § Crystals UrnS Qn HPF: 0   § Epithelial Cells (non renal): 0 /HPF  § Epithelial Cells (renal): 0       Assessment  · BPH with Urinary Obstruction       Benign prostatic hyperplasia with lower urinary tract symptoms     600.01/N40.1  Other obstructive and reflux uropathy     600.01/N13.8      Plan  · Medications  o tamsulosin 0.4 mg oral capsule   SIG: take 1 capsule (0.4 mg) by oral route once daily 1/2 hour following the same meal each day for 90 days   DISP: (90) capsule with 3 refills  Prescribed on 07/27/2020     o finasteride 5 mg oral tablet   SIG: take 1 tablet (5 mg) by oral route once daily for 90 days   DISP: (90) tablets with 3 refills  Refilled on 07/27/2020     o Medications have been Reconciled  o Transition of Care or Provider Policy  · Instructions  o Follow up in 1 year.  o Electronically Identified Patient Education Materials Provided Electronically     He will follow-up PRN with any problems. He will continue on Finasteride 5 mg PO QDAY and Flomax 0.4mg once a day.             Electronically Signed by: Karis Sousa MD -Author on July 27, 2020 05:04:12 PM

## 2021-05-13 NOTE — PROGRESS NOTES
Progress Note      Patient Name: Ami Ferrer   Patient ID: [id number]   Sex: Male   YOB: 1945    Primary Care Provider: Mundo Basilio MD   Referring Provider: Mundo Basilio MD    Visit Date: October 16, 2020    Provider: Kin Gutierrez MD   Location: [facility]   Location Address: [address] [place] [address]  [id number]   Location Phone: [phone]          Chief Complaint  · Followup Right Bipolar Hip Hemiarthroplasty 10/02/2020.       History Of Present Illness  Ami Ferrer is a 75 year old /White male who presents today in followup for his right hip. Mr. Ferrer sustained a fall and had a displaced femoral neck fracture, treated with hemiarthroplasty around two weeks ago. He has been discharged from the hospital recently and has been at a rehab facility. He has still been having some pain in the right hip, but no other complaints, otherwise doing well.       Past Medical History  Allergic rhinitis, chronic; Arthritis; Asthma; Chronic Obstructive Pulmonary Disease; COPD; Depression; Diabetes; Diabetes Mellitus, Type II; Difficulty in walking; Fatigue; Foot cramps; Foot pain, left; Foot pain, right; Fracture; GERD; Gout; Heart disease; Herniated Disc; High blood pressure; High cholesterol; Hypertension; Hypogonadism (Testicular Failure); Ingrowing nail; Ingrowing toenail; Leg pain; Limb Pain; Limb Swelling; Muscle cramps; Neuropathy; Numbness in feet; Polyneuropathy; Prostate Disorder; Seasonal allergies; Shortness Of Air; Tinea unguium; Type 2 diabetes mellitus with polyneuropathy         Past Surgical History  *Other; Arm Surgery; Artificial Joints/Limbs; Eye Implant; Finger Surgery; Hand surgery; Penile implant; Polypectomy; Sinus Surgery         Medication List  albuterol sulfate 90 mcg/actuation inhalation HFA aerosol inhaler; allopurinol 100 mg oral tablet; Amitiza 24 mcg oral capsule; aspirin 81 mg oral tablet,delayed release (DR/EC); Ativan 1 mg oral tablet;  atorvastatin 10 mg oral tablet; celecoxib 100 mg oral capsule; cetirizine 10 mg oral tablet; cyanocobalamin (vitamin B-12) 1,000 mcg/mL injection solution; Daily Multiple oral tablet; Dulera 200-5 mcg/actuation inhalation HFA aerosol inhaler; finasteride 5 mg oral tablet; fluticasone propionate 50 mcg/actuation nasal spray,suspension; glyburide 2.5 mg oral tablet; metformin 500 mg oral tablet; metoprolol succinate 25 mg oral tablet extended release 24 hr; Norco 7.5-325 mg oral tablet; omeprazole 20 mg oral capsule,delayed release(DR/EC); sertraline 100 mg oral tablet; Spiriva with HandiHaler 18 mcg inhalation capsule, w/inhalation device; tamsulosin 0.4 mg oral capsule; tramadol 50 mg oral tablet; trazodone 50 mg oral tablet; Tylenol Arthritis Pain 650 mg oral tablet extended release; vitamin B complex oral tablet; Vitamin B-12 500 mcg oral tablet; Vitamin D3 2,000 unit oral capsule         Allergy List  NO KNOWN DRUG ALLERGIES; I.V. Dye         Family Medical History  Pancreatic Neoplasm, Malignant; Cancer, Unspecified; Diabetes, unspecified type; Throat Cancer; Brain tumor; Family history of colon cancer; Bone Neoplasm, Malignant; Family history of Arthritis; Family history of cancer         Social History  Alcohol (Never); Alcohol Use (Never); lives alone; Recreational Drug Use (Never); Retired; Retired.; Tobacco (Former); ;          Review of Systems  · Constitutional  o Denies  o : fever, chills, weight loss  · Cardiovascular  o Denies  o : chest pain, shortness of breath  · Gastrointestinal  o Denies  o : liver disease, heartburn, nausea, blood in stools  · Genitourinary  o Denies  o : painful urination, blood in urine  · Integument  o Denies  o : rash, itching  · Neurologic  o Denies  o : headache, weakness, loss of consciousness  · Musculoskeletal  o Admits  o : painful, swollen joints  · Psychiatric  o Denies  o : drug/alcohol addiction, anxiety, depression      Physical  Examination  · Constitutional  o Appearance  o : well developed, well-nourished, no obvious deformities present  · Head and Face  o Head  o :   § Inspection  § : normocephalic  o Face  o :   § Inspection  § : no facial lesions  · Eyes  o Conjunctivae  o : conjunctivae normal  o Sclerae  o : sclerae white  · Ears, Nose, Mouth and Throat  o Ears  o :   § External Ears  § : appearance within normal limits  § Hearing  § : intact  o Nose  o :   § External Nose  § : appearance normal  · Neck  o Inspection/Palpation  o : normal appearance  o Range of Motion  o : full range of motion  · Respiratory  o Respiratory Effort  o : breathing unlabored  o Inspection of Chest  o : normal appearance  o Auscultation of Lungs  o : no audible wheezing or rales  · Cardiovascular  o Heart  o : regular rate  · Gastrointestinal  o Abdominal Examination  o : soft and non-tender  · Skin and Subcutaneous Tissue  o General Inspection  o : intact, no rashes  · Psychiatric  o General  o : Alert and oriented x3  o Judgement and Insight  o : judgment and insight intact  o Mood and Affect  o : mood normal, affect appropriate  · Right Hip  o Inspection  o : Incision well healing. Neurovascularly intact. Mild pain with hip range of motion. Negative Homans'.   · Imaging  o Imaging  o : X-rays reviewed and reveal an intact hip hemiarthroplasty with no complications.          Assessment  · Status post right anterior bipolar hip hemiarthroplasty 10/02/2020     V54.81/Z47.1      Plan  · Medications  o Medications have been Reconciled  o Transition of Care or Provider Policy  · Instructions  o Reviewed the patient's Past Medical, Social, and Family history as well as the ROS at today's visit, no changes.  o Call or return if worsening symptoms.  o Reviewed Xrays.  o This note was transcribed by Ana Luisa villa  o He is doing pretty well today. He is still having some pain in the hip, but no evidence of prosthetic complication on recent X-rays. Continue  physical therapy and rehab. Follow up in 6 weeks to recheck.             Electronically Signed by: Ana Luisa Bello-, Other -Author on October 20, 2020 11:19:19 AM  Electronically Co-signed by: Kin Gutierrez MD -Reviewer on October 20, 2020 05:42:21 PM

## 2021-05-13 NOTE — PROGRESS NOTES
Progress Note      Patient Name: Ami Ferrer   Patient ID: [id number]   Sex: Male   YOB: 1945    Primary Care Provider: Mundo Basilio MD   Referring Provider: Hardik Pitts DPM    Visit Date: June 24, 2020    Provider: Hardik Pitts DPM   Location: [facility]   Location Address: [address] [place] [address]  [id number]   Location Phone: [phone]          Chief Complaint  · Routine Foot Care Visit  · Diabetic Foot Examination       History Of Present Illness  Ami Ferrer complains of painful, elongated toenails which are thickened, yellowed, chalky, and cause pain with shoe gear and ambulation.      New, Established, New Problem:  est  Location:  Toenails  Duration:  Greater than five years  Onset:  Gradual  Nature:  sore with palpation.  Stable, worsening, improving:   Worsening  Aggravating factors:  Pain with shoe gear and ambulation.  Previous Treatment:  debridement    Patient denies any fevers, chills, nausea, vomiting, shortness of breathe, nor any other constitutional signs nor symptoms.    Patient relates no medical changes since their last visit.    Pt states their most recent blood glucose reading was 163.       Past Medical History  Arthritis; Asthma; COPD; Depression; Diabetes Mellitus, Type II; Difficulty in walking; Fatigue; Foot cramps; Foot pain, left; Foot pain, right; Fracture; GERD; Gout; Heart Disease; Herniated Disc; High cholesterol; Hypogonadism (Testicular Failure); Ingrowing nail; Ingrowing toenail; Leg pain; Muscle cramps; Neuropathy; Numbness in feet; Polyneuropathy; Tinea unguium; Type 2 diabetes mellitus with polyneuropathy         Past Surgical History  *Other; Arm Surgery; Finger Surgery; Hand surgery; Penile implant; Polypectomy; Sinus Surgery         Medication List  allopurinol 100 mg oral tablet; Amitiza 24 mcg oral capsule; aspirin 81 mg oral tablet,delayed release (DR/EC); atorvastatin 10 mg oral tablet;  "celecoxib 100 mg oral capsule; cetirizine 10 mg oral tablet; Daily Multiple oral tablet; Dulera 200-5 mcg/actuation inhalation HFA aerosol inhaler; finasteride 5 mg oral tablet; glyburide-metformin 5-500 mg oral tablet; Januvia 50 mg oral tablet; metformin 1,000 mg oral tablet; metoprolol succinate 25 mg oral tablet extended release 24 hr; omeprazole 20 mg oral capsule,delayed release(DR/EC); sertraline 100 mg oral tablet; tamsulosin 0.4 mg oral capsule,extended release 24hr; tramadol 50 mg oral tablet; trazodone 50 mg oral tablet; Tylenol Arthritis Pain 650 mg oral tablet extended release; Vitamin D3 2,000 unit oral capsule         Allergy List  I.V. Dye       Allergies Reconciled  Family Medical History  Pancreatic Neoplasm, Malignant; Diabetes, unspecified type; Throat Cancer; Brain tumor; Bone Neoplasm, Malignant; Family history of Arthritis; Family history of cancer         Social History  Alcohol (Never); lives alone; Retired; Tobacco (Former);          Review of Systems  · Constitutional  o Denies  o : fever, chills  · Eyes  o Denies  o : double vision  · HENT  o Denies  o : vertigo, recent head injury, hearing loss or ringing  · Cardiovascular  o Denies  o : chest pain, irregular heart beats  · Respiratory  o Denies  o : shortness of breath, productive cough  · Gastrointestinal  o Denies  o : nausea, vomiting  · Integument  o * See HPI  · Neurologic  o Admits  o : tingling or numbness  o Denies  o : altered mental status, seizures  · Musculoskeletal  o Denies  o : joint pain, joint swelling, limitation of motion      Vitals  Date Time BP Position Site L\R Cuff Size HR RR TEMP (F) WT  HT  BMI kg/m2 BSA m2 O2 Sat        06/24/2020 10:31 /50 Sitting    78 - R  96.9 173lbs 5oz 5'  7.5\" 26.74 1.93 95 %          Physical Examination  · Constitutional  o Appearance  o : well-nourished, well developed, no obvious deformities present, appears to be chronically ill   · Eyes  o Vision  o : Patient " wearing glasses.   · Respiratory  o Respiratory Effort  o : No labored breathing. Good respiratory effort.   · Cardiovascular  o Peripheral Vascular System  o :   § Pedal Pulses  § : Pedal Pulses are 2+ and symmetrical  § Extremities  § : There is no edema of the lower extremities  · Musculoskeletal  o Extremeties/Joint  o : Lower extremity muscle strength and range of motion is equal and symmetrical bilaterally. The knees are noted to be in normal alignment. Ankle alignment and range of motion is normal and foot structure is normal.  · Skin and Subcutaneous Tissue  o General Inspection  o : no lesions present, no areas of discoloration, skin turgor normal, texture normal  · Neurologic  o Sensation  o : Petrolia-Melo 5.07 monofilament absent to all assessed areas. Sharp/dull sensation is absent.  · Toes  o Toes: Right Foot  o :   § Inspection  § : semi-rigid flexion deformity-1st toe 2nd toe 3rd toe 4th toe 5th toe PIPJ   § Toenails  § : Toenails are hypertrophic, mycotc, dystrophic, thickened, with sublingual debris, incurvated, brittle toenail(s) at nail, Oncholysis of the foot   o Toes: Left Foot  o :   § Inspection  § : semi-rigid flexion deformity-2nd toe 3rd toe 4th toe 5th toe PIPJ   § Toenails  § : Toenails are hypertrophic, mycotc, dystrophic, thickened, with sublingual debris, incurvated, brittle toenail(s) at nail-2, 3, 4,, Onycholysis of the foot   · Procedures  o Nail Debridement  o : Nail debridement is indicated for the following toenails:, left hallux, left 2nd toe, left 3rd toe, left 4th toe, left 5th toe, right hallux, right 2nd toe, right 3rd toe, right 4th toe, right 5th toe. The nail was debrided of excessive thickness to appropriate levels of comfort and contour using, nail nippers. The procedure was without complications          Assessment  · Foot pain, left     729.5/M79.672  · Foot pain, right     729.5/M79.671  · Ingrowing nail     703.0/L60.0  · Polyneuropathy     356.9/G62.9  · Tinea  unguium     110.1/B35.1  · Type 2 diabetes mellitus with polyneuropathy       Type 2 diabetes mellitus with diabetic polyneuropathy     250.60/E11.42  · Difficulty in walking     719.7/R26.2      Plan  · Orders  o Debridement of six or more nails (26631, 44262, 94874, 14669, 05063, 04013) - 250.60/E11.42, 729.5/M79.672, 729.5/M79.671, 110.1/B35.1 - 06/24/2020  o Diabetic Foot (Motor and Sensory) Exam Completed [facility] (, , 2028F) - 250.60/E11.42 - 06/24/2020  · Medications  o Medications have been Reconciled  o Transition of Care or Provider Policy  · Instructions  o I have discussed the findings of this evaluation with the patient. The discussion included a complete verbal explanation of any changes in the examination results, diagnosis, and the current treatment plan. A schedule for future care needs was explained. If any questions should arise after returning home, I have encouraged the patient to feel free to contact Dr. Pitts. The patient states understanding and agreement with this plan.   o Patient is to monitor for problems and to contact Dr. Pitts for follow-up should such signs occur. Patient states understanding and agreement with this plan.   o Encouraged to follow-up with Primary Care Provider for preventative care.   o Diabetic foot exam performed and documented this date, compliant with CQM required standards. Detail of findings as noted in physical exam.Lower extremity Neuro exam for diabetic patient performed and documented this date, compliant with PQRS required standards. Detail of findings as noted in physical exam.Advised patient importance of good routine lower extremity hygiene. Advised patient importance of evaluating for intact skin and pain free nail borders. Advised patient to use mirror to evaluate plantar/ soles of feet for better visualization. Advised patient monitor and phone office to be seen if any cracking to skin, open lesions, painful nail borders or if nails become  elongated prior to next visit. Advised patient importance of daily cleansing of lower extremities, followed by good skin cream to maintain normal hydration of skin. Also advised patient importance of close daily monitoring of blood sugar. Advised to regulate diet and medications to maintain control of blood sugar in optimal range. Contact primary care provider if difficulties maintaining blood sugar levels.Advised Patient of presence of Diabetes Mellitus condition. Advised Patient risk of progression and worsening or improvement, then return of condition. Will monitor condition for any change in future. Treat with most appropriate treatment pending status of condition. Counseled and advised patient extensively on nature and ramifications of diabetes. Standard instructions given to patient for good diabetic foot care and maintenance. Advised importance of careful monitoring to avoid break down and complications secondary to diabetes. Advised patient importance of strict maintenance of blood sugar control. Advised patient of possible ominous results from neglect of condition, i.e.: amputation/ loss of digits, feet and legs, or even death.Patient states understands counseling, will monitor closely, continue good hygiene and routine diabetic foot care. Patient will contact office is questions or problems.   o Follow up in 9 weeks for Routine Foot Care.   o Electronically Identified Patient Education Materials Provided Electronically            Electronically Signed by: Hardik Pitts DPM -Author on June 24, 2020 10:54:23 AM

## 2021-05-14 VITALS
HEART RATE: 66 BPM | WEIGHT: 175 LBS | HEIGHT: 67 IN | SYSTOLIC BLOOD PRESSURE: 136 MMHG | DIASTOLIC BLOOD PRESSURE: 67 MMHG | TEMPERATURE: 97.3 F | BODY MASS INDEX: 27.47 KG/M2 | OXYGEN SATURATION: 98 %

## 2021-05-15 VITALS
OXYGEN SATURATION: 97 % | HEIGHT: 67 IN | BODY MASS INDEX: 27.62 KG/M2 | DIASTOLIC BLOOD PRESSURE: 58 MMHG | HEART RATE: 72 BPM | WEIGHT: 176 LBS | SYSTOLIC BLOOD PRESSURE: 114 MMHG

## 2021-05-15 VITALS
HEART RATE: 78 BPM | TEMPERATURE: 96.9 F | WEIGHT: 173.31 LBS | OXYGEN SATURATION: 95 % | SYSTOLIC BLOOD PRESSURE: 139 MMHG | BODY MASS INDEX: 27.2 KG/M2 | HEIGHT: 67 IN | DIASTOLIC BLOOD PRESSURE: 50 MMHG

## 2021-05-15 VITALS
HEIGHT: 67 IN | OXYGEN SATURATION: 99 % | SYSTOLIC BLOOD PRESSURE: 131 MMHG | WEIGHT: 181 LBS | BODY MASS INDEX: 28.41 KG/M2 | HEART RATE: 72 BPM | DIASTOLIC BLOOD PRESSURE: 58 MMHG

## 2021-05-15 VITALS
WEIGHT: 174 LBS | SYSTOLIC BLOOD PRESSURE: 111 MMHG | HEIGHT: 67 IN | DIASTOLIC BLOOD PRESSURE: 50 MMHG | BODY MASS INDEX: 27.31 KG/M2 | HEART RATE: 67 BPM

## 2021-05-15 VITALS
BODY MASS INDEX: 27.94 KG/M2 | OXYGEN SATURATION: 96 % | DIASTOLIC BLOOD PRESSURE: 63 MMHG | HEART RATE: 76 BPM | SYSTOLIC BLOOD PRESSURE: 137 MMHG | WEIGHT: 178 LBS | HEIGHT: 67 IN

## 2021-05-15 VITALS
DIASTOLIC BLOOD PRESSURE: 58 MMHG | BODY MASS INDEX: 27.11 KG/M2 | SYSTOLIC BLOOD PRESSURE: 140 MMHG | OXYGEN SATURATION: 97 % | HEIGHT: 69 IN | WEIGHT: 183 LBS | HEART RATE: 78 BPM

## 2021-05-15 VITALS
WEIGHT: 173 LBS | BODY MASS INDEX: 27.15 KG/M2 | HEIGHT: 67 IN | DIASTOLIC BLOOD PRESSURE: 48 MMHG | SYSTOLIC BLOOD PRESSURE: 132 MMHG

## 2021-05-16 VITALS — HEART RATE: 83 BPM | HEIGHT: 69 IN | OXYGEN SATURATION: 95 % | BODY MASS INDEX: 26.57 KG/M2 | WEIGHT: 179.37 LBS

## 2021-05-16 VITALS
HEIGHT: 68 IN | SYSTOLIC BLOOD PRESSURE: 125 MMHG | OXYGEN SATURATION: 97 % | HEART RATE: 72 BPM | DIASTOLIC BLOOD PRESSURE: 51 MMHG | BODY MASS INDEX: 26.98 KG/M2 | WEIGHT: 178 LBS

## 2021-05-16 VITALS
WEIGHT: 177 LBS | HEART RATE: 83 BPM | OXYGEN SATURATION: 96 % | HEIGHT: 69 IN | DIASTOLIC BLOOD PRESSURE: 57 MMHG | BODY MASS INDEX: 26.22 KG/M2 | SYSTOLIC BLOOD PRESSURE: 136 MMHG

## 2021-05-16 VITALS — BODY MASS INDEX: 25.77 KG/M2 | HEIGHT: 69 IN | WEIGHT: 174 LBS | HEART RATE: 78 BPM | OXYGEN SATURATION: 94 %

## 2021-05-16 VITALS
BODY MASS INDEX: 26.51 KG/M2 | OXYGEN SATURATION: 94 % | WEIGHT: 179 LBS | HEIGHT: 69 IN | DIASTOLIC BLOOD PRESSURE: 71 MMHG | HEART RATE: 86 BPM | SYSTOLIC BLOOD PRESSURE: 113 MMHG

## 2021-05-16 VITALS — WEIGHT: 174 LBS | BODY MASS INDEX: 25.77 KG/M2 | TEMPERATURE: 97.1 F | HEIGHT: 69 IN

## 2021-05-16 VITALS
BODY MASS INDEX: 26.09 KG/M2 | SYSTOLIC BLOOD PRESSURE: 136 MMHG | DIASTOLIC BLOOD PRESSURE: 64 MMHG | WEIGHT: 176.12 LBS | HEIGHT: 69 IN